# Patient Record
Sex: MALE | Race: WHITE | Employment: FULL TIME | RURAL
[De-identification: names, ages, dates, MRNs, and addresses within clinical notes are randomized per-mention and may not be internally consistent; named-entity substitution may affect disease eponyms.]

---

## 2017-02-06 RX ORDER — MELOXICAM 15 MG/1
TABLET ORAL
Qty: 30 TABLET | Refills: 3 | Status: SHIPPED | OUTPATIENT
Start: 2017-02-06

## 2017-03-28 RX ORDER — LISINOPRIL 5 MG/1
TABLET ORAL
Qty: 30 TABLET | Refills: 5 | Status: SHIPPED | OUTPATIENT
Start: 2017-03-28 | End: 2017-10-13 | Stop reason: SDUPTHER

## 2017-06-26 ENCOUNTER — OFFICE VISIT (OUTPATIENT)
Dept: FAMILY MEDICINE CLINIC | Age: 37
End: 2017-06-26
Payer: COMMERCIAL

## 2017-06-26 VITALS
DIASTOLIC BLOOD PRESSURE: 84 MMHG | BODY MASS INDEX: 39.68 KG/M2 | WEIGHT: 293 LBS | SYSTOLIC BLOOD PRESSURE: 134 MMHG | HEIGHT: 72 IN

## 2017-06-26 DIAGNOSIS — H60.331 ACUTE SWIMMER'S EAR OF RIGHT SIDE: Primary | ICD-10-CM

## 2017-06-26 PROCEDURE — 99213 OFFICE O/P EST LOW 20 MIN: CPT | Performed by: NURSE PRACTITIONER

## 2017-06-26 RX ORDER — LORATADINE 10 MG/1
10 TABLET ORAL DAILY
COMMUNITY

## 2017-06-26 RX ORDER — CIPROFLOXACIN AND DEXAMETHASONE 3; 1 MG/ML; MG/ML
4 SUSPENSION/ DROPS AURICULAR (OTIC) 2 TIMES DAILY
Qty: 1 BOTTLE | Refills: 0 | Status: SHIPPED | OUTPATIENT
Start: 2017-06-26 | End: 2017-07-03

## 2017-06-26 ASSESSMENT — PATIENT HEALTH QUESTIONNAIRE - PHQ9
SUM OF ALL RESPONSES TO PHQ QUESTIONS 1-9: 0
2. FEELING DOWN, DEPRESSED OR HOPELESS: 0
1. LITTLE INTEREST OR PLEASURE IN DOING THINGS: 0
SUM OF ALL RESPONSES TO PHQ9 QUESTIONS 1 & 2: 0

## 2017-09-15 ENCOUNTER — OFFICE VISIT (OUTPATIENT)
Dept: FAMILY MEDICINE CLINIC | Age: 37
End: 2017-09-15
Payer: COMMERCIAL

## 2017-09-15 VITALS
WEIGHT: 296.4 LBS | BODY MASS INDEX: 40.14 KG/M2 | OXYGEN SATURATION: 98 % | HEIGHT: 72 IN | DIASTOLIC BLOOD PRESSURE: 86 MMHG | SYSTOLIC BLOOD PRESSURE: 122 MMHG | HEART RATE: 75 BPM

## 2017-09-15 DIAGNOSIS — M54.50 ACUTE LEFT-SIDED LOW BACK PAIN WITHOUT SCIATICA: Primary | ICD-10-CM

## 2017-09-15 DIAGNOSIS — T14.8XXA MUSCLE STRAIN: ICD-10-CM

## 2017-09-15 PROCEDURE — 99213 OFFICE O/P EST LOW 20 MIN: CPT | Performed by: NURSE PRACTITIONER

## 2017-09-15 PROCEDURE — 96372 THER/PROPH/DIAG INJ SC/IM: CPT | Performed by: NURSE PRACTITIONER

## 2017-09-15 RX ORDER — TRIAMCINOLONE ACETONIDE 40 MG/ML
80 INJECTION, SUSPENSION INTRA-ARTICULAR; INTRAMUSCULAR ONCE
Status: COMPLETED | OUTPATIENT
Start: 2017-09-15 | End: 2017-09-15

## 2017-09-15 RX ADMIN — TRIAMCINOLONE ACETONIDE 80 MG: 40 INJECTION, SUSPENSION INTRA-ARTICULAR; INTRAMUSCULAR at 11:58

## 2017-09-15 ASSESSMENT — ENCOUNTER SYMPTOMS
ABDOMINAL PAIN: 0
COUGH: 0
NAUSEA: 0
DIARRHEA: 0
BACK PAIN: 1
VOMITING: 0
SHORTNESS OF BREATH: 0
WHEEZING: 0

## 2017-10-13 RX ORDER — LISINOPRIL 5 MG/1
TABLET ORAL
Qty: 30 TABLET | Refills: 5 | Status: SHIPPED | OUTPATIENT
Start: 2017-10-13

## 2018-08-17 ENCOUNTER — OFFICE VISIT (OUTPATIENT)
Dept: UROLOGY | Facility: CLINIC | Age: 38
End: 2018-08-17

## 2018-08-17 VITALS — HEIGHT: 72 IN | BODY MASS INDEX: 39.96 KG/M2 | WEIGHT: 295 LBS

## 2018-08-17 DIAGNOSIS — Z30.09 VASECTOMY EVALUATION: Primary | ICD-10-CM

## 2018-08-17 PROCEDURE — 99203 OFFICE O/P NEW LOW 30 MIN: CPT | Performed by: UROLOGY

## 2018-08-17 RX ORDER — MELOXICAM 15 MG/1
15 TABLET ORAL DAILY
COMMUNITY

## 2018-08-17 RX ORDER — DIAZEPAM 10 MG/1
10 TABLET ORAL ONCE
Qty: 1 TABLET | Refills: 0 | Status: SHIPPED | OUTPATIENT
Start: 2018-08-17 | End: 2018-08-17

## 2018-08-17 RX ORDER — OXYCODONE HYDROCHLORIDE AND ACETAMINOPHEN 5; 325 MG/1; MG/1
1 TABLET ORAL EVERY 4 HOURS PRN
Qty: 18 TABLET | Refills: 0 | Status: SHIPPED | OUTPATIENT
Start: 2018-08-17 | End: 2018-08-17

## 2018-08-17 RX ORDER — OXYCODONE HYDROCHLORIDE AND ACETAMINOPHEN 5; 325 MG/1; MG/1
1 TABLET ORAL EVERY 4 HOURS PRN
Qty: 18 TABLET | Refills: 0 | Status: SHIPPED | OUTPATIENT
Start: 2018-08-17

## 2018-08-17 RX ORDER — LORATADINE 10 MG/1
CAPSULE, LIQUID FILLED ORAL
COMMUNITY

## 2018-08-17 NOTE — PROGRESS NOTES
Mr. Phillips is 37 y.o. male    Chief Complaint   Patient presents with   • Sterilization       History of Present Illness  The patient has been pondering the option of a vasectomy for Several months. With regard to context of the decision, he presently has 3 children. He is . Associated/Relevant symptoms/signs include None. He voices no additional questions about birth control options.     The following portions of the patient's history were reviewed and updated as appropriate: allergies, current medications, past family history, past medical history, past social history, past surgical history and problem list.    Review of Systems   Constitutional: Negative for appetite change and fever.   HENT: Negative for hearing loss and sore throat.    Eyes: Negative for pain and redness.   Respiratory: Negative for cough and shortness of breath.    Cardiovascular: Negative for chest pain and leg swelling.   Gastrointestinal: Negative for anal bleeding, nausea and vomiting.   Endocrine: Negative for cold intolerance and heat intolerance.   Genitourinary: Negative for dysuria, flank pain, frequency, hematuria and urgency.   Musculoskeletal: Negative for joint swelling and myalgias.   Skin: Negative for color change and rash.   Allergic/Immunologic: Negative for immunocompromised state.   Neurological: Negative for dizziness and speech difficulty.   Hematological: Negative for adenopathy. Does not bruise/bleed easily.   Psychiatric/Behavioral: Negative for dysphoric mood and suicidal ideas.         Current Outpatient Prescriptions:   •  Loratadine (CLARITIN) 10 MG capsule, Take  by mouth., Disp: , Rfl:   •  meloxicam (MOBIC) 15 MG tablet, Take 15 mg by mouth Daily., Disp: , Rfl:   •  Multiple Vitamin (MULTI-VITAMIN DAILY PO), Take  by mouth., Disp: , Rfl:   •  diazePAM (VALIUM) 10 MG tablet, Take 1 tablet by mouth 1 (One) Time for 1 dose. Prior to procedure, Disp: 1 tablet, Rfl: 0  •  oxyCODONE-acetaminophen (PERCOCET)  "5-325 MG per tablet, Take 1 tablet by mouth Every 4 (Four) Hours As Needed for Moderate Pain ., Disp: 18 tablet, Rfl: 0    Past Medical History:   Diagnosis Date   • Arthritis    • Hypertension     has improved not on meds for any longer       History reviewed. No pertinent surgical history.    Social History     Social History   • Marital status:      Social History Main Topics   • Smoking status: Never Smoker   • Smokeless tobacco: Never Used   • Alcohol use No   • Drug use: No     Other Topics Concern   • Not on file       Family History   Problem Relation Age of Onset   • No Known Problems Father    • No Known Problems Mother        Objective    Ht 182.9 cm (72\")   Wt 134 kg (295 lb)   BMI 40.01 kg/m²     Physical Exam   Constitutional: He is oriented to person, place, and time. He appears well-developed and well-nourished.   HENT:   Head: Normocephalic and atraumatic.   Eyes: Conjunctivae are normal.   Neck: Normal range of motion. Neck supple.   Cardiovascular: Normal rate and regular rhythm.    Pulmonary/Chest: Effort normal. No respiratory distress. He exhibits no tenderness.   Abdominal: Soft. He exhibits no distension. There is no tenderness. Hernia confirmed negative in the right inguinal area and confirmed negative in the left inguinal area.   Genitourinary: Testes normal and penis normal. Right testis shows no mass, no swelling and no tenderness. Left testis shows no mass, no swelling and no tenderness. No phimosis, paraphimosis or penile tenderness.   Genitourinary Comments: The epididymis is palpably normal bilaterally without mass.  The vas deferens is also palpable bilaterally in the usual location and appears accessible for vasectomy.   Musculoskeletal: Normal range of motion. He exhibits no edema or deformity.   Neurological: He is alert and oriented to person, place, and time.   Skin: Skin is warm and dry.   Psychiatric: He has a normal mood and affect. His behavior is normal.   Vitals " reviewed.      No results found for any previous visit.       No results found for this or any previous visit.  Assessment and Plan    Juan José was seen today for sterilization.    Diagnoses and all orders for this visit:    Vasectomy evaluation  -     oxyCODONE-acetaminophen (PERCOCET) 5-325 MG per tablet; Take 1 tablet by mouth Every 4 (Four) Hours As Needed for Moderate Pain .  -     diazePAM (VALIUM) 10 MG tablet; Take 1 tablet by mouth 1 (One) Time for 1 dose. Prior to procedure  -     Semen Analysis, Post-vasectomy - Semen, Voided; Future  -     Vasectomy    Vasectomy Consult  Patient here for pre-vasectomy consultation. He denies previous genitourinary surgery. He was given the consent form, pre-vasectomy instruction sheet, and vasectomy booklet. I extensively reviewed with him the likely postoperative recuperative period as well as the need to continue to use contraception until he is notified by us of his sterility as confirmed with two negative post procedure semen analyses.  He understands the potential side effects of anesthesia, bleeding, scrotal hematoma, wound infection, epididymal orchitis, epididymal congestion, chronic testicular pain requiring further surgery, sperm granuloma, antisperm antibodies, early recanalization, spontaneous recanalization with pregnancy after demonstration of azoospermia. He is aware of alternatives to vasectomy. He has given this careful consideration and wishes to proceed with a vasectomy.     Favio reviewed with no suspicious activity

## 2018-09-07 ENCOUNTER — OFFICE VISIT (OUTPATIENT)
Dept: UROLOGY | Facility: CLINIC | Age: 38
End: 2018-09-07

## 2018-09-07 DIAGNOSIS — Z30.2 ENCOUNTER FOR VASECTOMY: Primary | ICD-10-CM

## 2018-09-07 PROCEDURE — 55250 REMOVAL OF SPERM DUCT(S): CPT | Performed by: UROLOGY

## 2018-09-07 NOTE — PROGRESS NOTES
The procedure's risks: bleeding, infection, chronic testicular pain, and recanalization of the vas were discussed in detail; benefits, and alternatives were discussed with patient.  Written consent was obtained prior to the procedure.  Prior to the procedure, the patient was pre-medicated with Valium 10mg.   His scrotum was prepped and sterilely draped in the normal fashion.  Anesthesia with 1% lidocaine.      Procedure details:  The same technique was used on the right and left vas.  The vas was isolated with the thumb and forefinger.  Lidocaine was then infiltrated below the epithelium and around the vas.  A persagital incision was made using a scalpel and sharp hemostats were used to dissect the parivasal tissue away.  Vas clamps were then used to grab the vas deferens and pull this out through the incision.  Further isolation of the vas from the perivasal tissue was then performed using sharp dissection until a 2 cm segment of vas was exposed.  Titanium clips were then placed on both the proximal and distal portions of the vas and a 3-0 chromic suture was placed under each clip.  The vas was then divided sharply, and the cut ends were cauterized.  Hemostasis was achieved using electrocautery.  The cut ends of the vas were then placed back into the scrotum and the incision was closed using an interrupted 3-0 chronic suture.  The vas appeared grossly normal and was discarded.   The same technique was then repeated on the left side.     Complications: none    Blood loss: minimal    Specimen: none    Discharge instructions:  Patient advised to proceed with light activity for the next week.   No sit down baths, swimming pools or hot tubs for 2 weeks.  He was advised to continue with scrotal support for the next 24 hours.  He was advised to continue with current birth control methods until he has given a sample in 8 weeks confirmed by post vasectomy semen analysis.

## 2018-10-16 ENCOUNTER — RESULTS ENCOUNTER (OUTPATIENT)
Dept: UROLOGY | Facility: CLINIC | Age: 38
End: 2018-10-16

## 2018-10-16 DIAGNOSIS — Z30.09 VASECTOMY EVALUATION: ICD-10-CM

## 2018-11-06 ENCOUNTER — APPOINTMENT (OUTPATIENT)
Dept: LAB | Facility: HOSPITAL | Age: 38
End: 2018-11-06
Attending: UROLOGY

## 2018-11-06 ENCOUNTER — RESULTS ENCOUNTER (OUTPATIENT)
Dept: UROLOGY | Facility: CLINIC | Age: 38
End: 2018-11-06

## 2018-11-06 ENCOUNTER — TELEPHONE (OUTPATIENT)
Dept: UROLOGY | Facility: CLINIC | Age: 38
End: 2018-11-06

## 2018-11-06 DIAGNOSIS — Z30.2 ENCOUNTER FOR VASECTOMY: ICD-10-CM

## 2018-11-06 DIAGNOSIS — Z30.2 ENCOUNTER FOR VASECTOMY: Primary | ICD-10-CM

## 2018-11-06 LAB — SPERM - POST VASECTOMY: ABNORMAL

## 2018-11-06 PROCEDURE — 89321 SEMEN ANAL SPERM DETECTION: CPT | Performed by: UROLOGY

## 2018-11-06 NOTE — TELEPHONE ENCOUNTER
Called and had to leave voicemail for pt that some sperm was seen on the semen analysis. He will need to bring in another sample in 8 weeks.

## 2018-11-06 NOTE — TELEPHONE ENCOUNTER
----- Message from Juan José Stover MD sent at 11/6/2018 11:25 AM CST -----  Immotile sperm seen on sample.  Please have him bring in another sample in 6 weeks

## 2025-05-20 ENCOUNTER — TELEPHONE (OUTPATIENT)
Dept: VASCULAR SURGERY | Facility: CLINIC | Age: 45
End: 2025-05-20
Payer: COMMERCIAL

## 2025-05-20 NOTE — TELEPHONE ENCOUNTER
LVM to r/s appt for the 30th of May with Harriet due to provider not in clinic, gave appt time of June 4th at 10:45 am or 11:30 am. Okay for HUB to relay and schedule.

## 2025-06-03 ENCOUNTER — TELEPHONE (OUTPATIENT)
Dept: VASCULAR SURGERY | Facility: CLINIC | Age: 45
End: 2025-06-03
Payer: COMMERCIAL

## 2025-06-04 ENCOUNTER — OFFICE VISIT (OUTPATIENT)
Dept: VASCULAR SURGERY | Facility: CLINIC | Age: 45
End: 2025-06-04
Payer: COMMERCIAL

## 2025-06-04 VITALS
HEIGHT: 72 IN | WEIGHT: 315 LBS | BODY MASS INDEX: 42.66 KG/M2 | OXYGEN SATURATION: 95 % | HEART RATE: 93 BPM | SYSTOLIC BLOOD PRESSURE: 142 MMHG | DIASTOLIC BLOOD PRESSURE: 80 MMHG

## 2025-06-04 DIAGNOSIS — E66.01 MORBIDLY OBESE: ICD-10-CM

## 2025-06-04 DIAGNOSIS — I10 ESSENTIAL HYPERTENSION: ICD-10-CM

## 2025-06-04 DIAGNOSIS — I87.323 CHRONIC VENOUS HYPERTENSION WITH INFLAMMATION INVOLVING BOTH SIDES: Primary | ICD-10-CM

## 2025-06-04 PROBLEM — R21 RASH: Status: RESOLVED | Noted: 2025-06-04 | Resolved: 2025-06-04

## 2025-06-04 PROBLEM — B86 INFESTATION BY SARCOPTES SCABIEI VAR HOMINIS: Status: RESOLVED | Noted: 2025-06-04 | Resolved: 2025-06-04

## 2025-06-04 RX ORDER — IBUPROFEN 800 MG/1
800 TABLET, FILM COATED ORAL EVERY 6 HOURS PRN
COMMUNITY

## 2025-06-04 RX ORDER — LISINOPRIL 10 MG/1
10 TABLET ORAL DAILY
COMMUNITY

## 2025-06-04 NOTE — PROGRESS NOTES
06/04/2025      Millie Villarreal APRN  [address] [place] [address]    Juan José Phillips  1980    Chief Complaint   Patient presents with    NEW PATIENT     Referred from Millie Villarreal for leg swelling and superficial varicosities. Had foot and ankle surgery in January 2024, has had issues with swelling in ankle every since. Lower leg leg often feels asleep. Former smoker of 20 years. Does wear compression but does not help.        Dear LEATHA Bergeron:      HPI  I had the pleasure of seeing your patient Juan José Phillips in the office today.  Thank you kindly for this consultation.  As you recall, Juan José Phillips is a 44 y.o.  male who you are currently following for routine health maintenance.  He is here today with complaints of swelling and heaviness to his left lower extremity.  He has a history of ankle fusion in 2024.  He also complains of some numbness and tingling.  He denies any symptoms of claudication or ischemia pain.  He does wear compression stockings at work, however he does not get relief.    Past Medical History:   Diagnosis Date    Arthritis     Asthma     Hypertension     has improved not on meds for any longer    Infestation by Sarcoptes scabiei purvi hominis 06/04/2025    Rash 06/04/2025       Past Surgical History:   Procedure Laterality Date    ANKLE FUSION Left 01/2024    VASECTOMY  2018       Family History   Problem Relation Age of Onset    No Known Problems Mother     No Known Problems Father     Diabetes Brother     Cancer Maternal Grandmother     Diabetes Maternal Grandfather        Social History     Socioeconomic History    Marital status:    Tobacco Use    Smoking status: Never    Smokeless tobacco: Never   Vaping Use    Vaping status: Never Used   Substance and Sexual Activity    Alcohol use: No    Drug use: No       Allergies   Allergen Reactions    Penicillins Hives       Current Outpatient Medications   Medication Instructions    ibuprofen (ADVIL,MOTRIN) 800  "mg, Every 6 Hours PRN    lisinopril (PRINIVIL,ZESTRIL) 10 mg, Daily    Loratadine (CLARITIN) 10 MG capsule Take  by mouth.    Multiple Vitamin (MULTI-VITAMIN DAILY PO) Take  by mouth.           Review of Systems   Constitutional: Negative.  Negative for diaphoresis and fever.   HENT: Negative.     Eyes: Negative.    Respiratory: Negative.  Negative for shortness of breath and wheezing.    Cardiovascular:  Positive for leg swelling (Left lower extremity). Negative for chest pain.   Gastrointestinal: Negative.  Negative for abdominal pain.   Endocrine: Negative.    Genitourinary: Negative.    Musculoskeletal: Negative.    Skin: Negative.    Allergic/Immunologic: Negative.    Neurological: Negative.  Negative for dizziness and weakness.   Hematological: Negative.    Psychiatric/Behavioral: Negative.         /80   Pulse 93   Ht 182.9 cm (72\")   Wt (!) 144 kg (318 lb)   SpO2 95%   BMI 43.13 kg/m²       Physical Exam  Vitals and nursing note reviewed.   Constitutional:       General: He is not in acute distress.     Appearance: Normal appearance. He is morbidly obese. He is not diaphoretic.   HENT:      Head: Normocephalic. No right periorbital erythema or left periorbital erythema.      Nose: Nose normal.   Eyes:      General: No scleral icterus.     Pupils: Pupils are equal.   Cardiovascular:      Rate and Rhythm: Normal rate and regular rhythm.      Pulses: Normal pulses.           Dorsalis pedis pulses are 2+ on the left side.        Posterior tibial pulses are 2+ on the left side.      Heart sounds: Normal heart sounds. No murmur heard.  Pulmonary:      Effort: Pulmonary effort is normal. No respiratory distress.      Breath sounds: Normal breath sounds.   Abdominal:      General: Bowel sounds are normal. There is no distension.      Palpations: Abdomen is soft.      Tenderness: There is no abdominal tenderness. There is no guarding.   Musculoskeletal:         General: No swelling or tenderness. Normal " range of motion.      Cervical back: Normal range of motion and neck supple.      Right lower leg: No edema.      Left lower leg: Edema present.   Feet:      Right foot:      Skin integrity: Skin integrity normal.      Left foot:      Skin integrity: Skin integrity normal.   Skin:     General: Skin is warm and dry.      Findings: No erythema or rash.   Neurological:      General: No focal deficit present.      Mental Status: He is alert and oriented to person, place, and time. Mental status is at baseline.      Cranial Nerves: No cranial nerve deficit.      Gait: Gait normal.   Psychiatric:         Attention and Perception: Attention normal.         Mood and Affect: Mood normal.         Behavior: Behavior normal.         Thought Content: Thought content normal.         Judgment: Judgment normal.       DIAGNOSTIC DATA:  No results found.    Patient Active Problem List   Diagnosis    Essential hypertension    Bony exostosis    Arthritis         ICD-10-CM ICD-9-CM   1. Chronic venous hypertension with inflammation involving both sides  I87.323 459.32   2. Essential hypertension  I10 401.9   3. Morbidly obese  E66.01 278.01           Plan: After thoroughly evaluating Juan José Phillips, I believe the best course of action is to remain conservative from vascular surgery standpoint.  I did explain that once you have had surgery or a DVT, you are likely to have increased swelling in that lower extremity that was affected.  The numbness and tingling he complains about is likely nerve damage from the surgery and since his surgery was in January 2024, this is long-term for him.  He would like to try new compression, wearing it daily not just at work, and elevating his leg is much as possible before he returns.  I will give the patient a prescription for compression stockings in the 20-30 mm pressure gradient range.  I did instruct the patient on how to wear these on a daily basis.  I would like him to keep his legs elevated when  he is not on them, and keep his legs well moisturized.  We will see the patient back in 3 months with a venous valvular insufficiency study. If the testing does show significant venous reflux, the patient may be a great candidate for endovenous closure as the patient's symptoms have significantly impacted their activities of daily living.  The patient can continue taking their current medication regimen as previously planned.  I did discuss vascular risk factors as they pertain to the progression of vascular disease including controlling his hypertension.  His blood pressure is slightly elevated today in office.  If this continues he should follow-up with his PCP for further recommendations.  Body mass index is 43.13 kg/m².  Class 3 Severe Obesity (BMI >=40). Obesity-related health conditions include the following: hypertension. Obesity is unchanged. BMI is is above average; BMI management plan is completed. We discussed portion control and increasing exercise.    This was all discussed in full with complete understanding.    Thank you for allowing me to participate in the care of your patient.  Please do not hesitate with any questions or concerns.  I will keep you aware of any further encounters with Juan José Phillips.        Sincerely yours,         LEATHA Garcia

## 2025-06-04 NOTE — LETTER
June 4, 2025     LEATHA Bergeron  [address] [facility] [id number]    Patient: Juan José Phillips   YOB: 1980   Date of Visit: 6/4/2025     Dear LEATHA Bergeron:       Thank you for referring Juan José Phillips to me for evaluation. Below are the relevant portions of my assessment and plan of care.    If you have questions, please do not hesitate to call me. I look forward to following Juan José along with you.         Sincerely,        LEATHA Garcia        CC: MD Elvis Flores Beth L, APRN  06/04/25 1306  Sign when Signing Visit  06/04/2025      Millie Villarreal APRN  [address] [place] [address]    Juan José Phillips  1980    Chief Complaint   Patient presents with   • NEW PATIENT     Referred from Millie Villarreal for leg swelling and superficial varicosities. Had foot and ankle surgery in January 2024, has had issues with swelling in ankle every since. Lower leg leg often feels asleep. Former smoker of 20 years. Does wear compression but does not help.        Dear LEATHA Bergeron:      HPI  I had the pleasure of seeing your patient Juan José Phillips in the office today.  Thank you kindly for this consultation.  As you recall, Juan José Phillips is a 44 y.o.  male who you are currently following for routine health maintenance.  He is here today with complaints of swelling and heaviness to his left lower extremity.  He has a history of ankle fusion in 2024.  He also complains of some numbness and tingling.  He denies any symptoms of claudication or ischemia pain.  He does wear compression stockings at work, however he does not get relief.    Past Medical History:   Diagnosis Date   • Arthritis    • Asthma    • Hypertension     has improved not on meds for any longer   • Infestation by Sarcoptes scabiei purvi hominis 06/04/2025   • Rash 06/04/2025       Past Surgical History:   Procedure Laterality Date   • ANKLE FUSION Left 01/2024   • VASECTOMY  2018       Family History  "  Problem Relation Age of Onset   • No Known Problems Mother    • No Known Problems Father    • Diabetes Brother    • Cancer Maternal Grandmother    • Diabetes Maternal Grandfather        Social History     Socioeconomic History   • Marital status:    Tobacco Use   • Smoking status: Never   • Smokeless tobacco: Never   Vaping Use   • Vaping status: Never Used   Substance and Sexual Activity   • Alcohol use: No   • Drug use: No       Allergies   Allergen Reactions   • Penicillins Hives       Current Outpatient Medications   Medication Instructions   • ibuprofen (ADVIL,MOTRIN) 800 mg, Every 6 Hours PRN   • lisinopril (PRINIVIL,ZESTRIL) 10 mg, Daily   • Loratadine (CLARITIN) 10 MG capsule Take  by mouth.   • Multiple Vitamin (MULTI-VITAMIN DAILY PO) Take  by mouth.           Review of Systems   Constitutional: Negative.  Negative for diaphoresis and fever.   HENT: Negative.     Eyes: Negative.    Respiratory: Negative.  Negative for shortness of breath and wheezing.    Cardiovascular:  Positive for leg swelling (Left lower extremity). Negative for chest pain.   Gastrointestinal: Negative.  Negative for abdominal pain.   Endocrine: Negative.    Genitourinary: Negative.    Musculoskeletal: Negative.    Skin: Negative.    Allergic/Immunologic: Negative.    Neurological: Negative.  Negative for dizziness and weakness.   Hematological: Negative.    Psychiatric/Behavioral: Negative.         /80   Pulse 93   Ht 182.9 cm (72\")   Wt (!) 144 kg (318 lb)   SpO2 95%   BMI 43.13 kg/m²       Physical Exam  Vitals and nursing note reviewed.   Constitutional:       General: He is not in acute distress.     Appearance: Normal appearance. He is morbidly obese. He is not diaphoretic.   HENT:      Head: Normocephalic. No right periorbital erythema or left periorbital erythema.      Nose: Nose normal.   Eyes:      General: No scleral icterus.     Pupils: Pupils are equal.   Cardiovascular:      Rate and Rhythm: Normal " rate and regular rhythm.      Pulses: Normal pulses.           Dorsalis pedis pulses are 2+ on the left side.        Posterior tibial pulses are 2+ on the left side.      Heart sounds: Normal heart sounds. No murmur heard.  Pulmonary:      Effort: Pulmonary effort is normal. No respiratory distress.      Breath sounds: Normal breath sounds.   Abdominal:      General: Bowel sounds are normal. There is no distension.      Palpations: Abdomen is soft.      Tenderness: There is no abdominal tenderness. There is no guarding.   Musculoskeletal:         General: No swelling or tenderness. Normal range of motion.      Cervical back: Normal range of motion and neck supple.      Right lower leg: No edema.      Left lower leg: Edema present.   Feet:      Right foot:      Skin integrity: Skin integrity normal.      Left foot:      Skin integrity: Skin integrity normal.   Skin:     General: Skin is warm and dry.      Findings: No erythema or rash.   Neurological:      General: No focal deficit present.      Mental Status: He is alert and oriented to person, place, and time. Mental status is at baseline.      Cranial Nerves: No cranial nerve deficit.      Gait: Gait normal.   Psychiatric:         Attention and Perception: Attention normal.         Mood and Affect: Mood normal.         Behavior: Behavior normal.         Thought Content: Thought content normal.         Judgment: Judgment normal.       DIAGNOSTIC DATA:  No results found.    Patient Active Problem List   Diagnosis   • Essential hypertension   • Bony exostosis   • Arthritis         ICD-10-CM ICD-9-CM   1. Chronic venous hypertension with inflammation involving both sides  I87.323 459.32   2. Essential hypertension  I10 401.9   3. Morbidly obese  E66.01 278.01           Plan: After thoroughly evaluating Juan José Phillips, I believe the best course of action is to remain conservative from vascular surgery standpoint.  I did explain that once you have had surgery or a DVT,  you are likely to have increased swelling in that lower extremity that was affected.  The numbness and tingling he complains about is likely nerve damage from the surgery and since his surgery was in January 2024, this is long-term for him.  He would like to try new compression, wearing it daily not just at work, and elevating his leg is much as possible before he returns.  I will give the patient a prescription for compression stockings in the 20-30 mm pressure gradient range.  I did instruct the patient on how to wear these on a daily basis.  I would like him to keep his legs elevated when he is not on them, and keep his legs well moisturized.  We will see the patient back in 3 months with a venous valvular insufficiency study. If the testing does show significant venous reflux, the patient may be a great candidate for endovenous closure as the patient's symptoms have significantly impacted their activities of daily living.  The patient can continue taking their current medication regimen as previously planned.  I did discuss vascular risk factors as they pertain to the progression of vascular disease including controlling his hypertension.  His blood pressure is slightly elevated today in office.  If this continues he should follow-up with his PCP for further recommendations.  Body mass index is 43.13 kg/m².  Class 3 Severe Obesity (BMI >=40). Obesity-related health conditions include the following: hypertension. Obesity is unchanged. BMI is is above average; BMI management plan is completed. We discussed portion control and increasing exercise.    This was all discussed in full with complete understanding.    Thank you for allowing me to participate in the care of your patient.  Please do not hesitate with any questions or concerns.  I will keep you aware of any further encounters with Juan José Phillips.        Sincerely yours,         LEATHA Garcia